# Patient Record
(demographics unavailable — no encounter records)

---

## 2025-03-24 NOTE — ASSESSMENT
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 CHELSI NAJERA has the classic appearance of bilateral venous sinus stenosis which is likely causing increased intracranial pressure.  We discussed options for next steps.  I can perform a lumbar puncture to confirm elevated opening pressure and catheter venogram to assess the significance of the stenosis.  At that point, if her pressure is high, she can start Diamox.  Since her papilledema is mild per Dr. Ventura's note, the other option is going directly to the trial of medical management.  If she does not tolerate Diamox or her papilledema persists, can perform LP/Venogram at that time.  I will also discuss the options with Dr. Ventura.     Plan: Discuss LP/Venogram vs Trial of Diamox with Dr. Ventura Will Follow Up with Her After the Above

## 2025-03-24 NOTE — HISTORY OF PRESENT ILLNESS
[de-identified] : Ms. NAJERA is a pleasant 37 year old female who presents today with a chief complaint of papilledema.  She has a long history of migraines, since her teens.  In 1/2025, her headaches changed and started waking her up in the middle of the night.  She was sent for an eye exam and was found to have papilledema.  She was referred to Dr. Ventura who confirmed grade 1 papilledema, per note.  Currently: Meds - None Pulsatile Tinnitus - None Headaches - Left sided daily pressure, not positional but occasionally wakes up with them, improved with going outside  Vision - No diplopia or TVOs; intermittent blurry vision; + papilledema 2/26/25

## 2025-03-24 NOTE — HISTORY OF PRESENT ILLNESS
[de-identified] : Ms. NAJERA is a pleasant 37 year old female who presents today with a chief complaint of papilledema.  She has a long history of migraines, since her teens.  In 1/2025, her headaches changed and started waking her up in the middle of the night.  She was sent for an eye exam and was found to have papilledema.  She was referred to Dr. Ventura who confirmed grade 1 papilledema, per note.  Currently: Meds - None Pulsatile Tinnitus - None Headaches - Left sided daily pressure, not positional but occasionally wakes up with them, improved with going outside  Vision - No diplopia or TVOs; intermittent blurry vision; + papilledema 2/26/25